# Patient Record
Sex: MALE | Race: WHITE | Employment: UNEMPLOYED | URBAN - METROPOLITAN AREA
[De-identification: names, ages, dates, MRNs, and addresses within clinical notes are randomized per-mention and may not be internally consistent; named-entity substitution may affect disease eponyms.]

---

## 2017-10-24 ENCOUNTER — TELEPHONE (OUTPATIENT)
Dept: FAMILY MEDICINE CLINIC | Facility: CLINIC | Age: 42
End: 2017-10-24

## 2017-10-24 NOTE — TELEPHONE ENCOUNTER
Called and talked to patient LOV 5/6/2016 I explained that we have not seen him in over a year and Dr Zarina Bates would want him seen before treating him, He was at work currently I offered for him to go to the [facility] to be seen as they are open later at which he

## 2017-10-24 NOTE — TELEPHONE ENCOUNTER
Patient has white throat spots, fever, sore throat will Dr. Gurinder Duke call in an antibiotic refill?

## 2017-11-01 ENCOUNTER — OFFICE VISIT (OUTPATIENT)
Dept: FAMILY MEDICINE CLINIC | Facility: CLINIC | Age: 42
End: 2017-11-01

## 2017-11-01 VITALS
SYSTOLIC BLOOD PRESSURE: 122 MMHG | DIASTOLIC BLOOD PRESSURE: 78 MMHG | BODY MASS INDEX: 18.81 KG/M2 | TEMPERATURE: 99 F | HEIGHT: 69 IN | HEART RATE: 80 BPM | WEIGHT: 127 LBS

## 2017-11-01 DIAGNOSIS — J02.9 SORE THROAT: Primary | ICD-10-CM

## 2017-11-01 DIAGNOSIS — J34.0 NASAL SEPTUM ULCERATION: ICD-10-CM

## 2017-11-01 PROCEDURE — 87880 STREP A ASSAY W/OPTIC: CPT | Performed by: PHYSICIAN ASSISTANT

## 2017-11-01 PROCEDURE — 99213 OFFICE O/P EST LOW 20 MIN: CPT | Performed by: PHYSICIAN ASSISTANT

## 2017-11-01 NOTE — PROGRESS NOTES
CC:  Patient presents with:  Sinus Problem  Sore Throat      HPI: Liliana Tucker presents for complaints of nasal congestion, nasal pain, and a ST. Symptoms have been present for a few weeks.  He has been using Sinex; he admits to using it about 10 times a day for t normal behavior    Physical Exam :  /78   Pulse 80   Temp 99.1 °F (37.3 °C) (Oral)   Ht 69\"   Wt 127 lb   BMI 18.75 kg/m²     Vital signs reviewed.     Constitutional: Well developed, well nourished; in no acute distress  HENT:   Head: Normocephalic,

## 2017-11-06 ENCOUNTER — HOSPITAL ENCOUNTER (OUTPATIENT)
Age: 42
Discharge: HOME | End: 2017-11-06
Attending: FAMILY MEDICINE
Payer: COMMERCIAL

## 2017-11-06 VITALS
SYSTOLIC BLOOD PRESSURE: 134 MMHG | RESPIRATION RATE: 16 BRPM | TEMPERATURE: 98 F | WEIGHT: 126 LBS | BODY MASS INDEX: 18.66 KG/M2 | DIASTOLIC BLOOD PRESSURE: 92 MMHG | OXYGEN SATURATION: 98 % | HEART RATE: 122 BPM | HEIGHT: 69 IN

## 2017-11-06 DIAGNOSIS — K64.9 HEMORRHOIDS, UNSPECIFIED HEMORRHOID TYPE: Primary | ICD-10-CM

## 2017-11-06 DIAGNOSIS — K62.5 RECTAL BLEEDING: ICD-10-CM

## 2017-11-06 PROCEDURE — 99203 OFFICE O/P NEW LOW 30 MIN: CPT

## 2017-11-06 PROCEDURE — 99213 OFFICE O/P EST LOW 20 MIN: CPT

## 2017-11-06 RX ORDER — LIDOCAINE 50 MG/G
CREAM RECTAL
Qty: 15 G | Refills: 0 | Status: SHIPPED | OUTPATIENT
Start: 2017-11-06

## 2017-11-06 NOTE — ED INITIAL ASSESSMENT (HPI)
Noticed some rectal swelling over the weekend. Has had rectal bleeding on and off for a couple months. Saw Dr Ember Ham pcp about 2 months and told had one hemorrhoid. Still having bleeding. Did not follow up with surgeon, was trying to change diet.

## 2017-11-06 NOTE — ED PROVIDER NOTES
[place] [id number] [facility] [place]   Patient presents with:  Rectal Bleeding    Stated Complaint: blood in stool    HPI    29-year-old male with history of hemorrhoids in the past presents to the clinic today with chief complaint 134/92  Pulse: 122  Resp: 16  Temp: 98 °F (36.7 °C)  Temp src: Temporal  SpO2: 98 %  O2 Device: None (Room air)    Current:/92   Pulse 122   Temp 98 °F (36.7 °C) (Temporal)   Resp 16   Ht 175.3 cm (5' 9\")   Wt 57.2 kg   SpO2 98%   BMI 18.61 kg/m² PM    START taking these medications    Hydrocortisone Acetate (CORTIFOAM) 10 % Rectal Foam  Place 1 applicator (90 mg total) rectally 2 (two) times daily. , Normal, Disp-15 g, R-0    Lidocaine, Anorectal, 5 % External Cream  Apply twice a day, Normal, Disp

## 2017-12-06 ENCOUNTER — TELEPHONE (OUTPATIENT)
Dept: FAMILY MEDICINE CLINIC | Facility: CLINIC | Age: 42
End: 2017-12-06

## 2017-12-06 NOTE — TELEPHONE ENCOUNTER
Attention: Dr Cheri Pablo mutual patient:     Patient Name:  Pedro Jara         ID: [id number]                                           : 3/20/1975     Is scheduled for:  Colonoscopy  Date of procedure: 17  Please verify patient is ok to

## 2017-12-06 NOTE — TELEPHONE ENCOUNTER
LOV with Fam Jain [facility] for sore throat, Patient has never had a physical in our office.   To obtain cardiac clearance, patient will need to schedule with Dr Tiffanie Zhao for Pre-Op physical  [facility] staff, please assist with this

## 2018-01-06 ENCOUNTER — APPOINTMENT (OUTPATIENT)
Dept: GENERAL RADIOLOGY | Facility: HOSPITAL | Age: 43
End: 2018-01-06
Attending: EMERGENCY MEDICINE
Payer: COMMERCIAL

## 2018-01-06 ENCOUNTER — HOSPITAL ENCOUNTER (EMERGENCY)
Facility: HOSPITAL | Age: 43
Discharge: HOME | End: 2018-01-06
Attending: EMERGENCY MEDICINE
Payer: COMMERCIAL

## 2018-01-06 VITALS
BODY MASS INDEX: 18.66 KG/M2 | DIASTOLIC BLOOD PRESSURE: 90 MMHG | HEART RATE: 78 BPM | SYSTOLIC BLOOD PRESSURE: 139 MMHG | HEIGHT: 69 IN | TEMPERATURE: 99 F | RESPIRATION RATE: 18 BRPM | WEIGHT: 126 LBS | OXYGEN SATURATION: 100 %

## 2018-01-06 DIAGNOSIS — M54.6 ACUTE RIGHT-SIDED THORACIC BACK PAIN: Primary | ICD-10-CM

## 2018-01-06 PROCEDURE — 71101 X-RAY EXAM UNILAT RIBS/CHEST: CPT | Performed by: EMERGENCY MEDICINE

## 2018-01-06 PROCEDURE — 99283 EMERGENCY DEPT VISIT LOW MDM: CPT

## 2018-01-06 RX ORDER — NAPROXEN 500 MG/1
500 TABLET ORAL 2 TIMES DAILY PRN
Qty: 20 TABLET | Refills: 0 | Status: SHIPPED | OUTPATIENT
Start: 2018-01-06 | End: 2018-01-13

## 2018-01-06 RX ORDER — ORPHENADRINE CITRATE 100 MG/1
100 TABLET, EXTENDED RELEASE ORAL 2 TIMES DAILY
Qty: 10 TABLET | Refills: 0 | Status: SHIPPED | OUTPATIENT
Start: 2018-01-06 | End: 2018-01-13

## 2018-01-06 RX ORDER — HYDROCODONE BITARTRATE AND ACETAMINOPHEN 5; 325 MG/1; MG/1
1-2 TABLET ORAL EVERY 6 HOURS PRN
Qty: 20 TABLET | Refills: 0 | Status: SHIPPED | OUTPATIENT
Start: 2018-01-06 | End: 2018-01-16

## 2018-01-06 RX ORDER — IBUPROFEN 600 MG/1
600 TABLET ORAL ONCE
Status: COMPLETED | OUTPATIENT
Start: 2018-01-06 | End: 2018-01-06

## 2018-01-06 NOTE — ED NOTES
Pt asked to change into gown. Pt notes that he refuses to take off his pants and will only change one MD is available for assessment.

## 2018-01-06 NOTE — ED INITIAL ASSESSMENT (HPI)
Pt presents to the ED accompanied by family with complaints of discomfort to right side of back for past few days. Pt states pain is not improving and having difficulty moving right arm due to pain. Pt states taking OTC pain meds intermittently.  Pt awake a

## 2018-01-06 NOTE — ED PROVIDER NOTES
Patient Seen in: [facility] Emergency Department    History   Patient presents with:  Back Pain (musculoskeletal)    Stated Complaint: back pain    HPI    Patient complains of right-sided thoracic back pain.   Patient states that his symptoms started negative except as noted above.     Physical Exam   ED Triage Vitals [01/06/18 1442]  BP: 134/82  Pulse: 113  Resp: 18  Temp: 99.2 °F (37.3 °C)  Temp src: Temporal  SpO2: 98 %  O2 Device: None (Room air)    Current:/90   Pulse 78   Temp 99.2 °F (37.3 [phone]  ------------------------------------------------------------       [facility]     Patient was a difficult historian. As noted, he was quite argumentative     patient has reproducible chest wall pain.   It is been present daily in varying degrees of intensit results of the x-ray with patient and his significant other. At this point, the patient states that he wanted to be discharged. As noted above, I have discussed further investigation to exclude internal abnormalities.   The patient states that he is confi 5-325 MG Oral Tab  Take 1-2 tablets by mouth every 6 (six) hours as needed.   Qty: 20 tablet Refills: 0

## 2018-12-07 ENCOUNTER — TELEPHONE (OUTPATIENT)
Dept: FAMILY MEDICINE CLINIC | Facility: CLINIC | Age: 43
End: 2018-12-07

## 2018-12-07 NOTE — TELEPHONE ENCOUNTER
Pt states has been having issues with being able to straighten out fingers, numbness to pinky and ring fingers and pain shouting up to elbow x several months.  Pt has [employer] and was assigned to a different office but that office states they are

## 2018-12-07 NOTE — TELEPHONE ENCOUNTER
Patient has no feeling in his pinky, ring finger and it's starting to move to his middle finger, also moving to his other hand.  Patient not able to do anything with his hand

## 2018-12-12 ENCOUNTER — APPOINTMENT (OUTPATIENT)
Dept: GENERAL RADIOLOGY | Facility: HOSPITAL | Age: 43
End: 2018-12-12
Attending: EMERGENCY MEDICINE
Payer: MEDICAID

## 2018-12-12 ENCOUNTER — HOSPITAL ENCOUNTER (EMERGENCY)
Facility: HOSPITAL | Age: 43
Discharge: HOME | End: 2018-12-12
Attending: EMERGENCY MEDICINE
Payer: MEDICAID

## 2018-12-12 VITALS
SYSTOLIC BLOOD PRESSURE: 133 MMHG | TEMPERATURE: 99 F | OXYGEN SATURATION: 98 % | RESPIRATION RATE: 16 BRPM | WEIGHT: 130 LBS | BODY MASS INDEX: 19.26 KG/M2 | DIASTOLIC BLOOD PRESSURE: 88 MMHG | HEIGHT: 69 IN | HEART RATE: 106 BPM

## 2018-12-12 DIAGNOSIS — S50.00XA CONTUSION OF ELBOW, UNSPECIFIED LATERALITY, INITIAL ENCOUNTER: ICD-10-CM

## 2018-12-12 DIAGNOSIS — G56.21 ULNAR NERVE PALSY OF RIGHT UPPER EXTREMITY: Primary | ICD-10-CM

## 2018-12-12 DIAGNOSIS — M25.531 RIGHT WRIST PAIN: ICD-10-CM

## 2018-12-12 PROCEDURE — 73080 X-RAY EXAM OF ELBOW: CPT | Performed by: EMERGENCY MEDICINE

## 2018-12-12 PROCEDURE — 73110 X-RAY EXAM OF WRIST: CPT | Performed by: EMERGENCY MEDICINE

## 2018-12-12 PROCEDURE — 99284 EMERGENCY DEPT VISIT MOD MDM: CPT

## 2018-12-12 PROCEDURE — 99285 EMERGENCY DEPT VISIT HI MDM: CPT

## 2018-12-12 PROCEDURE — 36415 COLL VENOUS BLD VENIPUNCTURE: CPT

## 2018-12-12 PROCEDURE — 80048 BASIC METABOLIC PNL TOTAL CA: CPT | Performed by: EMERGENCY MEDICINE

## 2018-12-12 RX ORDER — DICYCLOMINE HYDROCHLORIDE 10 MG/ML
20 INJECTION INTRAMUSCULAR ONCE
Status: DISCONTINUED | OUTPATIENT
Start: 2018-12-12 | End: 2018-12-12

## 2018-12-12 NOTE — ED PROVIDER NOTES
Patient Seen in: [facility] Emergency Department    History   Patient presents with:   Other    Stated Complaint: Loss of control of fingers on right hand and bilateral elbow pain    HPI    Patient is a 79-year-old right-hand-dominant male who presen other systems reviewed and negative except as noted above. Physical Exam     ED Triage Vitals [12/12/18 0657]   BP (!) 136/93   Pulse 112   Resp 20   Temp 98.8 °F (37.1 °C)   Temp src Temporal   SpO2 98 %   O2 Device None (Room air)       Current:BP Yannick Gallon ) normal limits          Xr Elbow, Complete (min 3 Views), Left (cpt=73080)    Result Date: 12/12/2018  CONCLUSION:  Negative    Dictated by: Dawson Matthews MD on 12/12/2018 at 7:54     Approved by: Dawson Matthews MD            Xr Elbow, Complete (min 3 Vi encounter diagnosis)  Contusion of elbow, unspecified laterality, initial encounter  Right wrist pain    Disposition:  Discharge  12/12/2018  8:21 am    Follow-up:  Vinnie Guadalupe, [address] [id number] [phone]    Call in 1

## 2018-12-12 NOTE — ED INITIAL ASSESSMENT (HPI)
Patient states about a month ago started losing strength in the right hand with occasional numbness/tingly feeling in fingertips. Today started having the same symptoms in the left hand after hitting his elbow about a day ago.
